# Patient Record
Sex: FEMALE | Race: ASIAN | NOT HISPANIC OR LATINO | URBAN - METROPOLITAN AREA
[De-identification: names, ages, dates, MRNs, and addresses within clinical notes are randomized per-mention and may not be internally consistent; named-entity substitution may affect disease eponyms.]

---

## 2021-02-22 ENCOUNTER — EMERGENCY (EMERGENCY)
Facility: HOSPITAL | Age: 26
LOS: 1 days | Discharge: HOME | End: 2021-02-22
Attending: EMERGENCY MEDICINE | Admitting: EMERGENCY MEDICINE
Payer: COMMERCIAL

## 2021-02-22 VITALS
SYSTOLIC BLOOD PRESSURE: 122 MMHG | OXYGEN SATURATION: 99 % | HEART RATE: 71 BPM | DIASTOLIC BLOOD PRESSURE: 72 MMHG | RESPIRATION RATE: 17 BRPM

## 2021-02-22 VITALS
HEART RATE: 66 BPM | WEIGHT: 119.93 LBS | RESPIRATION RATE: 18 BRPM | OXYGEN SATURATION: 100 % | SYSTOLIC BLOOD PRESSURE: 129 MMHG | TEMPERATURE: 99 F | DIASTOLIC BLOOD PRESSURE: 60 MMHG

## 2021-02-22 DIAGNOSIS — T78.40XA ALLERGY, UNSPECIFIED, INITIAL ENCOUNTER: ICD-10-CM

## 2021-02-22 PROCEDURE — 96375 TX/PRO/DX INJ NEW DRUG ADDON: CPT

## 2021-02-22 PROCEDURE — 96374 THER/PROPH/DIAG INJ IV PUSH: CPT

## 2021-02-22 PROCEDURE — 99284 EMERGENCY DEPT VISIT MOD MDM: CPT | Mod: 25

## 2021-02-22 PROCEDURE — 99285 EMERGENCY DEPT VISIT HI MDM: CPT

## 2021-02-22 RX ORDER — FAMOTIDINE 10 MG/ML
20 INJECTION INTRAVENOUS ONCE
Refills: 0 | Status: COMPLETED | OUTPATIENT
Start: 2021-02-22 | End: 2021-02-22

## 2021-02-22 RX ORDER — SODIUM CHLORIDE 9 MG/ML
1000 INJECTION INTRAMUSCULAR; INTRAVENOUS; SUBCUTANEOUS ONCE
Refills: 0 | Status: COMPLETED | OUTPATIENT
Start: 2021-02-22 | End: 2021-02-22

## 2021-02-22 RX ORDER — EPINEPHRINE 0.3 MG/.3ML
0.3 INJECTION INTRAMUSCULAR; SUBCUTANEOUS
Qty: 0.3 | Refills: 0
Start: 2021-02-22 | End: 2021-02-22

## 2021-02-22 RX ORDER — DIPHENHYDRAMINE HCL 50 MG
50 CAPSULE ORAL ONCE
Refills: 0 | Status: COMPLETED | OUTPATIENT
Start: 2021-02-22 | End: 2021-02-22

## 2021-02-22 RX ORDER — FAMOTIDINE 10 MG/ML
1 INJECTION INTRAVENOUS
Qty: 14 | Refills: 0
Start: 2021-02-22 | End: 2021-02-28

## 2021-02-22 RX ORDER — HYDROXYZINE HCL 10 MG
2 TABLET ORAL
Qty: 30 | Refills: 0
Start: 2021-02-22 | End: 2021-02-26

## 2021-02-22 RX ADMIN — Medication 50 MILLIGRAM(S): at 04:18

## 2021-02-22 RX ADMIN — SODIUM CHLORIDE 1000 MILLILITER(S): 9 INJECTION INTRAMUSCULAR; INTRAVENOUS; SUBCUTANEOUS at 04:18

## 2021-02-22 RX ADMIN — FAMOTIDINE 20 MILLIGRAM(S): 10 INJECTION INTRAVENOUS at 04:18

## 2021-02-22 RX ADMIN — Medication 125 MILLIGRAM(S): at 04:18

## 2021-02-22 NOTE — ED PROVIDER NOTE - NS ED MD EM SELECTION
[address] Exp Problem Focused - Mod. Complex Viral Gastroenteritis, Child  Viral gastroenteritis is also known as the stomach flu. This condition is caused by various viruses. These viruses can be passed from person to person very easily (are very contagious). This condition may affect the stomach, small intestine, and large intestine. It can cause sudden watery diarrhea, fever, and vomiting.    Diarrhea and vomiting can make your child feel weak and cause him or her to become dehydrated. Your child may not be able to keep fluids down. Dehydration can make your child tired and thirsty. Your child may also urinate less often and have a dry mouth. Dehydration can happen very quickly and can be dangerous.    It is important to replace the fluids that your child loses from diarrhea and vomiting. If your child becomes severely dehydrated, he or she may need to get fluids through an IV tube.    What are the causes?  Gastroenteritis is caused by various viruses, including rotavirus and norovirus. Your child can get sick by eating food, drinking water, or touching a surface contaminated with one of these viruses. Your child may also get sick from sharing utensils or other personal items with an infected person.    What increases the risk?  This condition is more likely to develop in children who:    Are not vaccinated against rotavirus.  Live with one or more children who are younger than 2 years old.  Go to a  facility.  Have a weak defense system (immune system).    What are the signs or symptoms?  Symptoms of this condition start suddenly 1–2 days after exposure to a virus. Symptoms may last a few days or as long as a week. The most common symptoms are watery diarrhea and vomiting. Other symptoms include:    Fever.  Headache.  Fatigue.  Pain in the abdomen.  Chills.  Weakness.  Nausea.  Muscle aches.  Loss of appetite.    How is this diagnosed?  This condition is diagnosed with a medical history and physical exam. Your child may also have a stool test to check for viruses.    How is this treated?  This condition typically goes away on its own. The focus of treatment is to prevent dehydration and restore lost fluids (rehydration). Your child's health care provider may recommend that your child takes an oral rehydration solution (ORS) to replace important salts and minerals (electrolytes). Severe cases of this condition may require fluids given through an IV tube.    Treatment may also include medicine to help with your child's symptoms.    Follow these instructions at home:  Follow instructions from your child's health care provider about how to care for your child at home.    Eating and drinking     Follow these recommendations as told by your child's health care provider:    Give your child an ORS, if directed. This is a drink that is sold at pharmacies and retail stores.  Encourage your child to drink clear fluids, such as water, low-calorie popsicles, and diluted fruit juice.  Continue to breastfeed or bottle-feed your young child. Do this in small amounts and frequently. Do not give extra water to your infant.  Encourage your child to eat soft foods in small amounts every 3–4 hours, if your child is eating solid food. Continue your child's regular diet, but avoid spicy or fatty foods, such as french fries and pizza.  Avoid giving your child fluids that contain a lot of sugar or caffeine, such as juice and soda.    General instructions     Have your child rest at home until his or her symptoms have gone away.  Make sure that you and your child wash your hands often. If soap and water are not available, use hand .  Make sure that all people in your household wash their hands well and often.  Give over-the-counter and prescription medicines only as told by your child's health care provider.  Watch your child's condition for any changes.  Give your child a warm bath to relieve any burning or pain from frequent diarrhea episodes.  Keep all follow-up visits as told by your child's health care provider. This is important.  Contact a health care provider if:  Your child has a fever.  Your child will not drink fluids.  Your child cannot keep fluids down.  Your child's symptoms are getting worse.  Your child has new symptoms.  Your child feels light-headed or dizzy.  Get help right away if:  You notice signs of dehydration in your child, such as:    No urine in 8–12 hours.  Cracked lips.  Not making tears while crying.  Dry mouth.  Sunken eyes.  Sleepiness.  Weakness.  Dry skin that does not flatten after being gently pinched.    You see blood in your child's vomit.  Your child's vomit looks like coffee grounds.  Your child has bloody or black stools or stools that look like tar.  Your child has a severe headache, a stiff neck, or both.  Your child has trouble breathing or is breathing very quickly.  Your child's heart is beating very quickly.  Your child's skin feels cold and clammy.  Your child seems confused.  Your child has pain when he or she urinates.  This information is not intended to replace advice given to you by your health care provider. Make sure you discuss any questions you have with your health care provider.

## 2021-02-22 NOTE — ED ADULT NURSE NOTE - OBJECTIVE STATEMENT
BIBS  patient complaining of redness in the face and puffy eyes started last night and woke up this with more swelling around the eyes. As per patient similar happened last month after drinking tea and wine  and went away for a week with no treatment . As per patient she had tea and wine last night before the symptoms started .  Denies sob, cp, fevers ,  other covid like symptoms and no recent travels.

## 2021-02-22 NOTE — ED PROVIDER NOTE - OBJECTIVE STATEMENT
The pt is a 24 y/o F, who presents to ED c/o allergic reaction since yest. Pt states that her face was a little red and puffy yest, but she "didn't think anything of it", woke up w/more redness and swelling. Took a benadryl 4 hrs ago. Denies new food, new soap or detergent, new face cream or makeup, sob, n/v, throat swelling, rash to body, fevers, chills

## 2021-02-22 NOTE — ED PROVIDER NOTE - PATIENT PORTAL LINK FT
You can access the FollowMyHealth Patient Portal offered by  by registering at the following website: [email or web address]. By joining [facility] FollowMyHealth portal, you will also be able to view your health information using other applications (apps) compatible with our system.

## 2021-02-22 NOTE — ED PROVIDER NOTE - ENMT, MLM
Airway patent, Nasal mucosa clear. Mouth with normal mucosa. Throat has no vesicles, no oropharyngeal exudates and uvula is midline. no stridor. + erythema to face - does not extend past the neck, + swelling to eyelids, no lip or tongue swelling

## 2021-02-22 NOTE — ED PROVIDER NOTE - CARE PROVIDER_API CALL
Makayla Seymour)  Dermatology  [address] [place] [address], [place] [address]  Phone: [phone]  Fax: [phone]  Follow Up Time:     Dayanara Cuba)  Medicine  Dermatology  [address] [place] [address], [address]  [place] [address]  Phone: ([phone]  Fax: ()-  Follow Up Time:

## 2021-02-22 NOTE — ED PROVIDER NOTE - CLINICAL SUMMARY MEDICAL DECISION MAKING FREE TEXT BOX
pt w/localized allergic reaction to face, ? allergen, doubt food allergy since only face involved, no resp distress, no airway compromise, given ivf and h1/h2 blockers plus steroids, symptoms improved, hemodynamically stable for dc, allergy prevention discussed, will dc w/short course of steroids and h1/h2 blockers, to f/u w/allergist, pt understands and agrees w/plan, strict return precautions given
